# Patient Record
Sex: MALE | Race: WHITE | NOT HISPANIC OR LATINO | Employment: UNEMPLOYED | URBAN - METROPOLITAN AREA
[De-identification: names, ages, dates, MRNs, and addresses within clinical notes are randomized per-mention and may not be internally consistent; named-entity substitution may affect disease eponyms.]

---

## 2019-07-15 ENCOUNTER — TELEPHONE (OUTPATIENT)
Dept: FAMILY MEDICINE CLINIC | Facility: CLINIC | Age: 24
End: 2019-07-15

## 2019-07-15 DIAGNOSIS — S68.119S TRAUMATIC AMPUTATION OF FINGER, SEQUELA (HCC): Primary | ICD-10-CM

## 2019-07-15 NOTE — TELEPHONE ENCOUNTER
Patient's dad called requesting a general surgery referral  Patient sawed finger off during the weekend with a chain saw and has an appt tomorrow with surgeon  Okay to generate?

## 2019-07-16 ENCOUNTER — TELEPHONE (OUTPATIENT)
Dept: FAMILY MEDICINE CLINIC | Facility: CLINIC | Age: 24
End: 2019-07-16

## 2019-07-16 ENCOUNTER — TREATMENT (OUTPATIENT)
Dept: FAMILY MEDICINE CLINIC | Facility: CLINIC | Age: 24
End: 2019-07-16

## 2019-07-16 DIAGNOSIS — S68.119S TRAUMATIC AMPUTATION OF FINGER, SEQUELA (HCC): Primary | ICD-10-CM

## 2019-07-16 NOTE — TELEPHONE ENCOUNTER
----- Message from Rubia Thornton MD sent at 7/16/2019 10:32 AM EDT -----  I placed freferral for hand sureon ; make certain he gets seen iin next 24 hrs

## 2019-07-16 NOTE — TELEPHONE ENCOUNTER
Spoke to patients father patient was getting a splint put on  Did see surgeon as we spoke at [facility]

## 2019-07-17 ENCOUNTER — TELEPHONE (OUTPATIENT)
Dept: FAMILY MEDICINE CLINIC | Facility: CLINIC | Age: 24
End: 2019-07-17

## 2019-07-17 NOTE — TELEPHONE ENCOUNTER
Called and left message for pt to clarify what insurance he has  In his chart it states [employer]  However [facility] called stating he needs an insurance referral for [facility] [employer]  If so it needs to be updated in chart and need ID # and copy of his card  Pt has appt 07/22/19

## 2019-07-19 NOTE — TELEPHONE ENCOUNTER
Insurance referral is done for [place]  64  Pt has appt 07/22/19  Provider #[id number]  Referral# [id number]  Phone #[phone] ext [phone]